# Patient Record
Sex: MALE | Race: WHITE | URBAN - METROPOLITAN AREA
[De-identification: names, ages, dates, MRNs, and addresses within clinical notes are randomized per-mention and may not be internally consistent; named-entity substitution may affect disease eponyms.]

---

## 2017-06-28 NOTE — PATIENT DISCUSSION
Questionable MACULAR HOLE, OD:  REFER TO RETINAL SPECIALIST, DR. Robby Bunn FOR EVALUATION AND TREATMENT. RETURN FOR FOLLOW-UP AS SCHEDULED.

## 2017-07-12 NOTE — PATIENT DISCUSSION
New Prescription: bromfenac (bromfenac): drops: 0.09% 1 drop twice a day as directed into both eyes 07-

## 2017-07-12 NOTE — PATIENT DISCUSSION
LETTER TO DR. Fields Coil OFFICE:  MACULAR MICROANEURYSMS PRESUMABLY DUE TO CHRONIC HTN VS. NEW ONSET DIABETES MELLITUS. RECOMMEND PRIMARY CARE FOLLOW-UP WITHIN 1 MONTH FOR BP CHECK, FASTING BG, AND HGB A1C. PLEASE FAX RESULTS BACK TO DR. Tanner Vasques [phone].

## 2017-07-12 NOTE — PATIENT DISCUSSION
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 DIABETIC MACULAR EDEMA, OD&gt;OS:  START TRIAL OF BROMFENAC 2X/DAY OU. RETURN FOR IVFA STUDY, CONSIDER ANTI-VEGF THERAPY IF NO IMPROVEMENT.

## 2017-10-30 ENCOUNTER — IMPORTED ENCOUNTER (OUTPATIENT)
Dept: URBAN - METROPOLITAN AREA CLINIC 50 | Facility: CLINIC | Age: 72
End: 2017-10-30

## 2017-11-22 NOTE — PATIENT DISCUSSION
New Prescription: bromfenac (bromfenac): drops: 0.09% 1 drop twice a day as directed into both eyes 11-

## 2018-01-05 ENCOUNTER — IMPORTED ENCOUNTER (OUTPATIENT)
Dept: URBAN - METROPOLITAN AREA CLINIC 50 | Facility: CLINIC | Age: 73
End: 2018-01-05

## 2018-01-09 ENCOUNTER — IMPORTED ENCOUNTER (OUTPATIENT)
Dept: URBAN - METROPOLITAN AREA CLINIC 50 | Facility: CLINIC | Age: 73
End: 2018-01-09

## 2018-03-29 ENCOUNTER — IMPORTED ENCOUNTER (OUTPATIENT)
Dept: URBAN - METROPOLITAN AREA CLINIC 50 | Facility: CLINIC | Age: 73
End: 2018-03-29

## 2018-05-23 NOTE — PATIENT DISCUSSION
NONPROLIFERATIVE DIABETIC RETINOPATHY, OU: STABLE.  RETURN FOR FOLLOW-UP AS SCHEDULED FOR DILATED EYE EXAM

## 2018-07-06 NOTE — PATIENT DISCUSSION
FRITZ OU:  PRESCRIBE ARTIFICIAL TEARS BID - QID. DECREASE OUTDOOR EXPOSURE AND USE UV PROTECTION/ SUNGLASSES WITH OUTDOOR ACTIVITIES. RETURN FOR FOLLOW UP AS SCHEDULED.

## 2018-07-06 NOTE — PATIENT DISCUSSION
Pinguecula Counseling:  I have explained to the patient at length the diagnosis of pinguecula and its pathophysiology. I recommended the patient adequately protect their eyes from excessive UV light and dry, elzbieta conditions. The use of artificial tears in dry conditions was encouraged. Return for follow-up as scheduled.

## 2018-07-06 NOTE — PATIENT DISCUSSION
Epiretinal Membrane Counseling: The diagnosis and natural history of epiretinal membrane was discussed with the patient including the risk of progression with retinal traction resulting in visual distortion. Patient instructed on how to do [address] to check for distortion in vision, The patient is instructed to call back immediately if any vision changes, and keep follow up as scheduled.

## 2018-07-06 NOTE — PATIENT DISCUSSION
MACULA EDEMA WITH RETINOPATHY OF UNKNOWN ETIOLOGY OS:  MONITOR BS AND BP CLOSE. CONTINUE TO SEE RETINA SPECIALIST AS SCHEDULED. WAS TOLD BY HER PRIMARY CARE PHYSICIAN THAT SHE DOES NOT HAVE DIABETES OR HYPERTENSION.

## 2018-07-06 NOTE — PATIENT DISCUSSION
Macular Edema Counseling:  I have explained to the patient at length the diagnosis of macular edema and its pathophysiology. I have discussed the various treatment options including medications and surgery. I stressed the importance of management of this condition in order to decrease the risk of permanent damage to the eye. Patient was advised to call if there is any decrease in vision or loss of contrast sensitivity or color vision. Return for follow-up as scheduled.

## 2018-08-02 ENCOUNTER — IMPORTED ENCOUNTER (OUTPATIENT)
Dept: URBAN - METROPOLITAN AREA CLINIC 50 | Facility: CLINIC | Age: 73
End: 2018-08-02

## 2018-08-20 ENCOUNTER — IMPORTED ENCOUNTER (OUTPATIENT)
Dept: URBAN - METROPOLITAN AREA CLINIC 50 | Facility: CLINIC | Age: 73
End: 2018-08-20

## 2018-08-29 NOTE — PATIENT DISCUSSION
DIABETIC MACULAR EDEMA OS: [address] PATIENT ELECTS TO CONTINUE BROMFENAC ONLY. DECREASE TO BROMFENAC 1X/DAY OS.

## 2018-12-26 NOTE — PATIENT DISCUSSION
RETINAL MICROANEURYSMS, OU:  CONSISTENT WITH MILD NPDR. DIET-CONTROLLED. STABLE.  RETURN FOR FOLLOW-UP AS SCHEDULED FOR DILATED EYE EXAM.

## 2019-01-14 ENCOUNTER — IMPORTED ENCOUNTER (OUTPATIENT)
Dept: URBAN - METROPOLITAN AREA CLINIC 50 | Facility: CLINIC | Age: 74
End: 2019-01-14

## 2019-06-17 NOTE — PATIENT DISCUSSION
Assessment/Plan: Coreen Malagon is a 48year old male who presents today, per referral by Dr Jessica Jimenez, for consultation regarding diverticulitis  He had a colonoscopy on 6/10/19 that showed polyps of sigmoid colon, moderate diverticulitis of sigmoid colon, and mild diverticulitis of the hepatic flexure, ascending colon, and cecum  Pathology showed the polyps of sigmoid colon were filiform hyperplastic polyps with no dysplasia or malignancy  Discussed risks, benefits, and alternatives to laparoscopic colon resection  Explained the surgery as well as pre- and post-procedural protocols and restrictions  Lifting restrictions of no greater than 15 pounds and no strenuous activity for 2 weeks after surgery  No heavy lifting of greater than 25 pounds for weeks 3 and 4  He will be on a low fiber diet for 2 weeks after the procedure  After 2 weeks, he should move to a high fiber diet  He understands that the procedure would reduce his risk of future diverticular attacks in the sigmoid colon, but he may still have future attacks in other parts of his colon  As his attacks have all been uncomplicated, he does not need to pursue surgery at this time  If he has an attack of complicated diverticulitis, he should consider having surgery  He would like to have the procedure  The office will schedule him for the procedure  He knows to contact the office if any questions or concerns arise  Hernias- Physical exam revealed a small umbilical hernia and small bilateral inguinal hernias  Discussed risks, benefits, and alternatives to laparoscopic repair of these hernias  Explained the surgery as well as pre- and post-procedural protocols and restrictions  If his hernias are reducible and asymptomatic, he may monitor them at this time  If he notices pain or a non-reducible bulge, he should contact the office  Skin- Multiple pigmented nevi, scattered hemangiomas, and seborrhea of neck and back   Recommend he has his PCP monitor these COUNSELING: annually  No problem-specific Assessment & Plan notes found for this encounter  There are no diagnoses linked to this encounter  Subjective:      Patient ID: Alex Fisher is a 48 y o  male  Chyna Mckenzie is a 48year old male who presents today, per referral by Dr Kem Temple, for consultation regarding diverticulitis  He had a diverticular attack in November 2018 and had a CT scan from 11/14/18 showed mild uncomplicated acute sigmoid diverticulitis  He has had 5 or 6 diverticular attacks and has been managed as an outpatient for all of his attacks because his wife says he refuses to be admitted for diverticulitis  He says his attacks have been non-complicated sigmoid diverticulitis  He had a colonoscopy 9 years ago that showed diverticulitis and he had another colonoscopy on 6/10/19 that showed diverticulitis and polyps  The following portions of the patient's history were reviewed and updated as appropriate: allergies, current medications, past family history, past medical history, past social history, past surgical history and problem list     Review of Systems   Constitutional: Negative  HENT: Negative  Eyes: Negative  Respiratory: Negative  Cardiovascular: Negative  Gastrointestinal: Negative  Endocrine: Negative  Genitourinary: Negative  Musculoskeletal: Negative  Skin: Negative  Allergic/Immunologic: Negative  Neurological: Negative  Hematological: Negative  Psychiatric/Behavioral: Negative  All other systems reviewed and are negative  Objective:      /81   Pulse 73   Temp 99 2 °F (37 3 °C) (Tympanic)   Resp 16   Ht 5' 10" (1 778 m)   Wt 83 8 kg (184 lb 12 8 oz)   BMI 26 52 kg/m²          Physical Exam   Constitutional: He is oriented to person, place, and time  He appears well-developed and well-nourished  No distress  HENT:   Head: Normocephalic and atraumatic     Right Ear: External ear normal    Left Ear: External ear normal    Nose: Nose normal    Mouth/Throat: Oropharynx is clear and moist  No oropharyngeal exudate  Eyes: Conjunctivae and EOM are normal  Right eye exhibits no discharge  Left eye exhibits no discharge  No scleral icterus  Neck: Normal range of motion  Neck supple  No JVD present  No tracheal deviation present  No thyromegaly present  Cardiovascular: Normal rate, regular rhythm, normal heart sounds and intact distal pulses  Exam reveals no gallop and no friction rub  No murmur heard  Pulmonary/Chest: Effort normal and breath sounds normal  No stridor  No respiratory distress  He has no wheezes  He has no rales  He exhibits no tenderness  Abdominal: Soft  Bowel sounds are normal  He exhibits no distension and no mass  There is no tenderness  There is no rebound and no guarding  A hernia (small umbilical; small bilateral inguinal) is present  Musculoskeletal: Normal range of motion  He exhibits no edema, tenderness or deformity  Lymphadenopathy:     He has no cervical adenopathy  Neurological: He is alert and oriented to person, place, and time  No cranial nerve deficit  Coordination normal    Skin: Skin is dry  No rash noted  He is not diaphoretic  No erythema  No pallor  Multiple pigmented nevi, scattered hemangiomas, and seborrhea of neck and back  Psychiatric: He has a normal mood and affect  His behavior is normal  Thought content normal    Nursing note and vitals reviewed  By signing my name below, I, Flaco Bobby, attest that this documentation has been prepared under the direction and in the presence of Gudelia Moise MD  Electronically Signed: Viki Miller  6/17/19  Shaila Whitaker, personally performed the services described in this documentation  All medical record entries made by the scribe were at my direction and in my presence   I have reviewed the chart and discharge instructions and agree that the record reflects my personal performance and is accurate and complete  Susan Camargo MD  6/17/19

## 2019-06-26 NOTE — PATIENT DISCUSSION
Diabetic Macular Edema Counseling: I have explained to the patient at length the diagnosis of diabetic macular edema and its pathophysiology. I have discussed the various treatment options including medications and surgery. I stressed the importance of management of this condition in order to decrease the risk of permanent damage to the eye. Patient was advised to call if there is any decrease in vision or loss of contrast sensitivity or color vision. Return for follow-up as scheduled.

## 2019-06-26 NOTE — PATIENT DISCUSSION
RETINAL MICROANEURYSMS, OU: STABLE. CONSISTENT WITH MILD NONPROLIFERATIVE DIABETIC RETINOPATHY. GLUCOSE IS CURRENTLY DIET-CONTROLLED.  RETURN FOR FOLLOW-UP AS SCHEDULED FOR DILATED EYE EXAM.

## 2019-06-27 ENCOUNTER — IMPORTED ENCOUNTER (OUTPATIENT)
Dept: URBAN - METROPOLITAN AREA CLINIC 50 | Facility: CLINIC | Age: 74
End: 2019-06-27

## 2019-07-12 NOTE — PATIENT DISCUSSION
Epiretinal Membrane Counseling: The diagnosis and natural history of epiretinal membrane was discussed with the patient including the risk of progression with retinal traction resulting in visual distortion. Patient instructed on how to do [address] to check for distortion in vision, The patient is instructed to call back immediately if any vision changes, and keep follow up as scheduled.

## 2019-07-12 NOTE — PATIENT DISCUSSION
NONPROLIFERATIVE DIABETIC RETINOPATHY, OU WITH EDEMA: RETURN FOR FOLLOW-UP AS SCHEDULED WITH RETINAL SPECIALIST, DR. Johanny Albarran.

## 2019-07-22 ENCOUNTER — IMPORTED ENCOUNTER (OUTPATIENT)
Dept: URBAN - METROPOLITAN AREA CLINIC 50 | Facility: CLINIC | Age: 74
End: 2019-07-22

## 2019-09-04 ENCOUNTER — IMPORTED ENCOUNTER (OUTPATIENT)
Dept: URBAN - METROPOLITAN AREA CLINIC 50 | Facility: CLINIC | Age: 74
End: 2019-09-04

## 2020-01-08 NOTE — PATIENT DISCUSSION
DIABETIC MACULAR EDEMA, OS:  [address] PATIENT ELECTS TO CONTINUE TO OBSERVE. RETURN AS SCHEDULED.

## 2020-01-08 NOTE — PATIENT DISCUSSION
RETINA IS ATTACHED OU: PVD OU; NO EPIRETINAL MEMBRANE; NO HOLES OR TEARS SEEN ON DILATED EXAM TODAY.  RETINAL DETACHMENT SIGNS AND SYMPTOMS REVIEWED

## 2020-01-13 ENCOUNTER — IMPORTED ENCOUNTER (OUTPATIENT)
Dept: URBAN - METROPOLITAN AREA CLINIC 50 | Facility: CLINIC | Age: 75
End: 2020-01-13

## 2020-01-27 ENCOUNTER — IMPORTED ENCOUNTER (OUTPATIENT)
Dept: URBAN - METROPOLITAN AREA CLINIC 50 | Facility: CLINIC | Age: 75
End: 2020-01-27

## 2020-07-27 NOTE — PATIENT DISCUSSION
DIABETES WITH RETINOPATHY: CONTINUE WITH DR. Veronica Corral, RETINAL SPECIALIST. RETURN FOR FOLLOW-UP AS SCHEDULED.

## 2020-08-14 ENCOUNTER — IMPORTED ENCOUNTER (OUTPATIENT)
Dept: URBAN - METROPOLITAN AREA CLINIC 50 | Facility: CLINIC | Age: 75
End: 2020-08-14

## 2020-08-21 ENCOUNTER — IMPORTED ENCOUNTER (OUTPATIENT)
Dept: URBAN - METROPOLITAN AREA CLINIC 50 | Facility: CLINIC | Age: 75
End: 2020-08-21

## 2020-08-25 ENCOUNTER — IMPORTED ENCOUNTER (OUTPATIENT)
Dept: URBAN - METROPOLITAN AREA CLINIC 50 | Facility: CLINIC | Age: 75
End: 2020-08-25

## 2020-09-03 ENCOUNTER — IMPORTED ENCOUNTER (OUTPATIENT)
Dept: URBAN - METROPOLITAN AREA CLINIC 50 | Facility: CLINIC | Age: 75
End: 2020-09-03

## 2020-09-03 NOTE — PATIENT DISCUSSION
"""Plan Istent Inject OS during CE IOL."" ""Continue Cosopt both eyes twice a day ."" ""Continue Latanoprost both eyes at bedtime ."""

## 2020-09-15 ENCOUNTER — IMPORTED ENCOUNTER (OUTPATIENT)
Dept: URBAN - METROPOLITAN AREA CLINIC 50 | Facility: CLINIC | Age: 75
End: 2020-09-15

## 2020-09-15 NOTE — PATIENT DISCUSSION
"""S/P IOL OS: Tecnis ZCB00 +21.0 ([facility]: [place]) +Femto/Arcs +Omidria +iStent inject. Continue post operative instructions and drops per schedule.  Continue glaucoma drops as prescribed."" ""Increase Pred-Moxi-Nepaf left eye three times a day

## 2020-09-16 ENCOUNTER — IMPORTED ENCOUNTER (OUTPATIENT)
Dept: URBAN - METROPOLITAN AREA CLINIC 50 | Facility: CLINIC | Age: 75
End: 2020-09-16

## 2020-10-22 ENCOUNTER — IMPORTED ENCOUNTER (OUTPATIENT)
Dept: URBAN - METROPOLITAN AREA CLINIC 50 | Facility: CLINIC | Age: 75
End: 2020-10-22

## 2020-10-22 NOTE — PATIENT DISCUSSION
"""S/P IOL OS: Tecnis ZCB00 +21.0 ([facility]: [place]) +Femto/Arcs +Omidria +iStent inject.  Continue ""

## 2020-11-03 ENCOUNTER — IMPORTED ENCOUNTER (OUTPATIENT)
Dept: URBAN - METROPOLITAN AREA CLINIC 50 | Facility: CLINIC | Age: 75
End: 2020-11-03

## 2020-11-12 ENCOUNTER — IMPORTED ENCOUNTER (OUTPATIENT)
Dept: URBAN - METROPOLITAN AREA CLINIC 50 | Facility: CLINIC | Age: 75
End: 2020-11-12

## 2021-03-11 ENCOUNTER — IMPORTED ENCOUNTER (OUTPATIENT)
Dept: URBAN - METROPOLITAN AREA CLINIC 50 | Facility: CLINIC | Age: 76
End: 2021-03-11

## 2021-04-17 ASSESSMENT — TONOMETRY
OD_IOP_MMHG: 10
OS_IOP_MMHG: 18
OS_IOP_MMHG: 15
OS_IOP_MMHG: 14
OS_IOP_MMHG: 17
OD_IOP_MMHG: 15
OS_IOP_MMHG: 12
OD_IOP_MMHG: 14
OD_IOP_MMHG: 15
OD_IOP_MMHG: 13
OD_IOP_MMHG: 9
OD_IOP_MMHG: 15
OD_IOP_MMHG: 14
OD_IOP_MMHG: 07
OD_IOP_MMHG: 16
OS_IOP_MMHG: 33
OD_IOP_MMHG: 16
OD_IOP_MMHG: 12
OS_IOP_MMHG: 16
OS_IOP_MMHG: 14
OS_IOP_MMHG: 10
OS_IOP_MMHG: 14
OD_IOP_MMHG: 10
OS_IOP_MMHG: 12
OS_IOP_MMHG: 15
OS_IOP_MMHG: 13
OD_IOP_MMHG: 10
OS_IOP_MMHG: 08
OS_IOP_MMHG: 13

## 2021-04-17 ASSESSMENT — VISUAL ACUITY
OD_CC: 20/30
OD_BAT: 20/40
OD_OTHER: 20/60. >20/400.
OD_SC: 20/20
OS_OTHER: 20/40. 20/400.
OS_BAT: 20/40
OS_SC: 20/25-2
OD_SC: 20/60
OD_SC: 20/25
OS_SC: 20/20-
OS_SC: 20/20
OS_SC: 20/20-
OS_CC: 20/25
OS_BAT: 20/25
OS_OTHER: 20/25. 20/30.
OS_CC: J1+
OD_BAT: 20/40-
OD_OTHER: 20/40. 20/50.
OS_SC: 20/40
OS_BAT: 20/40
OS_SC: 20/25-
OS_OTHER: 20/50. 20/100.
OS_SC: 20/25-
OS_OTHER: 20/40. 20/40.
OD_OTHER: 20/60. >20/400.
OD_BAT: 20/40
OD_SC: 20/40+2
OD_SC: 20/30-
OS_CC: 20/25
OS_SC: 20/25-2
OD_SC: 20/40
OD_SC: 20/40
OS_OTHER: 20/50. 20/80.
OS_SC: 20/25
OS_BAT: 20/50
OS_CC: 20/25
OD_SC: 20/30+2
OD_SC: 20/30+
OS_BAT: 20/40
OD_SC: 20/25
OD_CC: J1+
OD_CC: 20/30
OD_BAT: 20/60
OS_OTHER: 20/40. 20/40.
OS_BAT: 20/50
OD_OTHER: 20/60. >20/400.
OD_BAT: 20/60
OD_BAT: 20/60
OS_SC: 20/25+
OD_OTHER: 20/40. 20/60.
OS_SC: 20/40
OD_CC: 20/30
OS_CC: 20/100-1
OD_OTHER: 20/100. >20/400.
OD_BAT: 20/100
OD_SC: 20/25-
OD_SC: 20/20
OD_OTHER: 20/40-. >20/400.

## 2021-04-17 ASSESSMENT — PACHYMETRY
OD_CT_UM: 515
OD_CT_UM: 515
OS_CT_UM: 511
OS_CT_UM: 511

## 2021-07-13 ENCOUNTER — DIAGNOSTICS - HVF/OCT (OUTPATIENT)
Dept: URBAN - METROPOLITAN AREA CLINIC 53 | Facility: CLINIC | Age: 76
End: 2021-07-13

## 2021-07-13 ENCOUNTER — PREPPED CHART (OUTPATIENT)
Dept: URBAN - METROPOLITAN AREA CLINIC 53 | Facility: CLINIC | Age: 76
End: 2021-07-13

## 2021-07-13 DIAGNOSIS — H40.1131: ICD-10-CM

## 2021-07-13 PROCEDURE — 92133 CPTRZD OPH DX IMG PST SGM ON: CPT

## 2021-07-13 PROCEDURE — 92083 EXTENDED VISUAL FIELD XM: CPT

## 2021-07-15 ENCOUNTER — DIAGNOSTIC TESTING ONLY (OUTPATIENT)
Dept: URBAN - METROPOLITAN AREA CLINIC 48 | Facility: CLINIC | Age: 76
End: 2021-07-15

## 2021-07-15 DIAGNOSIS — H47.233: ICD-10-CM

## 2021-07-15 PROCEDURE — 92273 FULL FIELD ERG W/I&R: CPT

## 2021-07-15 NOTE — PATIENT DISCUSSION
"""Monitor ERM for changes. Informed patient of potential for worsening. Instructed patient to call with changes in vision. Recommend regular Amsler grid checks.  ""." Dx:  L shoulder pain- following shot lateral deltoid      Authorized # of Visits:  10         Next MD visit: none scheduled  Fall Risk: standard         Precautions: n/a             Subjective:   Discomfort with cane over head - dicussed with pt not to pus 5 mins  US 5 mins 1.5   PROm R shoulder 15 x each plane                  Tape with KT   3 mins post deltoid         Post shoulder stretch 3 x 20 secs          CP 10 mins                                              Skilled Services:  Skilled manual PT and

## 2021-07-29 ENCOUNTER — COMPREHENSIVE EXAM (OUTPATIENT)
Dept: URBAN - METROPOLITAN AREA CLINIC 53 | Facility: CLINIC | Age: 76
End: 2021-07-29

## 2021-07-29 DIAGNOSIS — H35.373: ICD-10-CM

## 2021-07-29 DIAGNOSIS — H47.233: ICD-10-CM

## 2021-07-29 DIAGNOSIS — H16.223: ICD-10-CM

## 2021-07-29 DIAGNOSIS — H26.493: ICD-10-CM

## 2021-07-29 DIAGNOSIS — H40.1132: ICD-10-CM

## 2021-07-29 PROCEDURE — 92134 CPTRZ OPH DX IMG PST SGM RTA: CPT

## 2021-07-29 PROCEDURE — 92014 COMPRE OPH EXAM EST PT 1/>: CPT

## 2021-07-29 ASSESSMENT — VISUAL ACUITY
OD_GLARE: 20/25
OS_SC: 20/30
OD_SC: 20/20
OD_GLARE: 20/25
OS_PH: 20/20
OS_GLARE: 20/20
OU_SC: J2
OS_GLARE: 20/25

## 2021-07-29 ASSESSMENT — KERATOMETRY
OS_AXISANGLE_DEGREES: 41
OD_AXISANGLE_DEGREES: 175
OS_K2POWER_DIOPTERS: 41.25
OD_K2POWER_DIOPTERS: 41.00
OS_K1POWER_DIOPTERS: 40.25
OD_K1POWER_DIOPTERS: 40.50
OS_AXISANGLE2_DEGREES: 131
OD_AXISANGLE2_DEGREES: 85

## 2021-07-29 ASSESSMENT — TONOMETRY
OS_IOP_MMHG: 17
OD_IOP_MMHG: 16

## 2022-02-09 NOTE — PATIENT DISCUSSION
The patient will be followed closely and treatment will be considered if the edema does not improve over the next few visits.

## 2022-07-14 NOTE — PATIENT DISCUSSION
Discussed presence of mild ERM. Recommended observation for now. Can consider surgical intervention in the future if the ERM progresses and the patient becomes more symptomatic. asymptomatic

## 2024-01-05 NOTE — PATIENT DISCUSSION
"""s/p istent OS. "" ""Continue Cosopt both eyes twice a day ."" ""Continue Latanoprost both eyes at bedtime .""" 66-year-old male PMH of CAD with 6 stents (most recent TITO to ostial LM on 6/2/23, HFrEF (EF~50%, 11/26/23), A-fib on Xarelto, HTN, HLD, T2DM, CKD3, former smoker, presenting with intermittent chest pain concerning for ACS. Patient admitted for further workup and management.

## 2025-01-27 NOTE — PATIENT DISCUSSION
Continue: Refresh Optive (carboxymethylcellulose-glycern): drops: 0.5-0.9% 1 drop twice a day into both eyes 08:30

## 2025-05-30 NOTE — PATIENT DISCUSSION
Bed: H37  Expected date:   Expected time:   Means of arrival:   Comments:  TRIAGE   Continue: bromfenac (bromfenac): drops: 0.09% 1 drop twice a day as directed into left eye 11-
